# Patient Record
Sex: MALE | Race: WHITE | NOT HISPANIC OR LATINO | Employment: OTHER | URBAN - METROPOLITAN AREA
[De-identification: names, ages, dates, MRNs, and addresses within clinical notes are randomized per-mention and may not be internally consistent; named-entity substitution may affect disease eponyms.]

---

## 2024-06-12 ENCOUNTER — APPOINTMENT (OUTPATIENT)
Dept: GENERAL RADIOLOGY | Facility: HOSPITAL | Age: 49
End: 2024-06-12
Payer: OTHER GOVERNMENT

## 2024-06-12 ENCOUNTER — HOSPITAL ENCOUNTER (EMERGENCY)
Facility: HOSPITAL | Age: 49
Discharge: HOME | End: 2024-06-12
Attending: EMERGENCY MEDICINE
Payer: OTHER GOVERNMENT

## 2024-06-12 ENCOUNTER — APPOINTMENT (OUTPATIENT)
Dept: ULTRASOUND IMAGING | Facility: HOSPITAL | Age: 49
End: 2024-06-12
Payer: OTHER GOVERNMENT

## 2024-06-12 ENCOUNTER — APPOINTMENT (OUTPATIENT)
Dept: CT IMAGING | Facility: HOSPITAL | Age: 49
End: 2024-06-12
Payer: OTHER GOVERNMENT

## 2024-06-12 VITALS
BODY MASS INDEX: 27.72 KG/M2 | TEMPERATURE: 97.6 F | OXYGEN SATURATION: 98 % | SYSTOLIC BLOOD PRESSURE: 124 MMHG | DIASTOLIC BLOOD PRESSURE: 69 MMHG | RESPIRATION RATE: 18 BRPM | HEIGHT: 71 IN | WEIGHT: 198 LBS | HEART RATE: 52 BPM

## 2024-06-12 DIAGNOSIS — K22.4 ESOPHAGEAL SPASM: ICD-10-CM

## 2024-06-12 DIAGNOSIS — K80.21 CALCULUS OF GALLBLADDER WITH BILIARY OBSTRUCTION BUT WITHOUT CHOLECYSTITIS: Primary | ICD-10-CM

## 2024-06-12 DIAGNOSIS — R07.89 ATYPICAL CHEST PAIN: ICD-10-CM

## 2024-06-12 LAB
ALBUMIN SERPL-MCNC: 4.4 G/DL (ref 3.5–5.2)
ALBUMIN/GLOB SERPL: 1.8 G/DL
ALP SERPL-CCNC: 65 U/L (ref 39–117)
ALT SERPL W P-5'-P-CCNC: 89 U/L (ref 1–41)
ANION GAP SERPL CALCULATED.3IONS-SCNC: 10 MMOL/L (ref 5–15)
APTT PPP: 24.8 SECONDS (ref 22.7–35.4)
AST SERPL-CCNC: 168 U/L (ref 1–40)
BASOPHILS # BLD AUTO: 0.07 10*3/MM3 (ref 0–0.2)
BASOPHILS NFR BLD AUTO: 0.7 % (ref 0–1.5)
BILIRUB SERPL-MCNC: 0.2 MG/DL (ref 0–1.2)
BUN SERPL-MCNC: 16 MG/DL (ref 6–20)
BUN/CREAT SERPL: 16.7 (ref 7–25)
CALCIUM SPEC-SCNC: 9.5 MG/DL (ref 8.6–10.5)
CHLORIDE SERPL-SCNC: 104 MMOL/L (ref 98–107)
CO2 SERPL-SCNC: 27 MMOL/L (ref 22–29)
CREAT SERPL-MCNC: 0.96 MG/DL (ref 0.76–1.27)
DEPRECATED RDW RBC AUTO: 40 FL (ref 37–54)
EGFRCR SERPLBLD CKD-EPI 2021: 96.3 ML/MIN/1.73
EOSINOPHIL # BLD AUTO: 0.1 10*3/MM3 (ref 0–0.4)
EOSINOPHIL NFR BLD AUTO: 1 % (ref 0.3–6.2)
ERYTHROCYTE [DISTWIDTH] IN BLOOD BY AUTOMATED COUNT: 12.6 % (ref 12.3–15.4)
GEN 5 2HR TROPONIN T REFLEX: 11 NG/L
GLOBULIN UR ELPH-MCNC: 2.4 GM/DL
GLUCOSE SERPL-MCNC: 148 MG/DL (ref 65–99)
HCT VFR BLD AUTO: 39.2 % (ref 37.5–51)
HGB BLD-MCNC: 12.9 G/DL (ref 13–17.7)
HOLD SPECIMEN: NORMAL
HOLD SPECIMEN: NORMAL
IMM GRANULOCYTES # BLD AUTO: 0.02 10*3/MM3 (ref 0–0.05)
IMM GRANULOCYTES NFR BLD AUTO: 0.2 % (ref 0–0.5)
INR PPP: 0.95 (ref 0.9–1.1)
LIPASE SERPL-CCNC: 63 U/L (ref 13–60)
LYMPHOCYTES # BLD AUTO: 2.93 10*3/MM3 (ref 0.7–3.1)
LYMPHOCYTES NFR BLD AUTO: 29.7 % (ref 19.6–45.3)
MCH RBC QN AUTO: 28.7 PG (ref 26.6–33)
MCHC RBC AUTO-ENTMCNC: 32.9 G/DL (ref 31.5–35.7)
MCV RBC AUTO: 87.1 FL (ref 79–97)
MONOCYTES # BLD AUTO: 0.49 10*3/MM3 (ref 0.1–0.9)
MONOCYTES NFR BLD AUTO: 5 % (ref 5–12)
NEUTROPHILS NFR BLD AUTO: 6.25 10*3/MM3 (ref 1.7–7)
NEUTROPHILS NFR BLD AUTO: 63.4 % (ref 42.7–76)
NRBC BLD AUTO-RTO: 0 /100 WBC (ref 0–0.2)
NT-PROBNP SERPL-MCNC: <36 PG/ML (ref 0–900)
PLATELET # BLD AUTO: 296 10*3/MM3 (ref 140–450)
PMV BLD AUTO: 10.8 FL (ref 6–12)
POTASSIUM SERPL-SCNC: 3.7 MMOL/L (ref 3.5–5.2)
PROT SERPL-MCNC: 6.8 G/DL (ref 6–8.5)
PROTHROMBIN TIME: 12.9 SECONDS (ref 11.7–14.2)
QT INTERVAL: 375 MS
QTC INTERVAL: 375 MS
RBC # BLD AUTO: 4.5 10*6/MM3 (ref 4.14–5.8)
SODIUM SERPL-SCNC: 141 MMOL/L (ref 136–145)
TROPONIN T DELTA: 2 NG/L
TROPONIN T SERPL HS-MCNC: 9 NG/L
WBC NRBC COR # BLD AUTO: 9.86 10*3/MM3 (ref 3.4–10.8)
WHOLE BLOOD HOLD COAG: NORMAL
WHOLE BLOOD HOLD SPECIMEN: NORMAL

## 2024-06-12 PROCEDURE — 83690 ASSAY OF LIPASE: CPT | Performed by: PHYSICIAN ASSISTANT

## 2024-06-12 PROCEDURE — 71045 X-RAY EXAM CHEST 1 VIEW: CPT

## 2024-06-12 PROCEDURE — 80053 COMPREHEN METABOLIC PANEL: CPT | Performed by: EMERGENCY MEDICINE

## 2024-06-12 PROCEDURE — 36415 COLL VENOUS BLD VENIPUNCTURE: CPT

## 2024-06-12 PROCEDURE — 83880 ASSAY OF NATRIURETIC PEPTIDE: CPT | Performed by: PHYSICIAN ASSISTANT

## 2024-06-12 PROCEDURE — 74174 CTA ABD&PLVS W/CONTRAST: CPT

## 2024-06-12 PROCEDURE — 76705 ECHO EXAM OF ABDOMEN: CPT

## 2024-06-12 PROCEDURE — 85730 THROMBOPLASTIN TIME PARTIAL: CPT | Performed by: PHYSICIAN ASSISTANT

## 2024-06-12 PROCEDURE — 93010 ELECTROCARDIOGRAM REPORT: CPT | Performed by: INTERNAL MEDICINE

## 2024-06-12 PROCEDURE — 96374 THER/PROPH/DIAG INJ IV PUSH: CPT

## 2024-06-12 PROCEDURE — 93005 ELECTROCARDIOGRAM TRACING: CPT | Performed by: EMERGENCY MEDICINE

## 2024-06-12 PROCEDURE — 25810000003 SODIUM CHLORIDE 0.9 % SOLUTION: Performed by: PHYSICIAN ASSISTANT

## 2024-06-12 PROCEDURE — 85610 PROTHROMBIN TIME: CPT | Performed by: PHYSICIAN ASSISTANT

## 2024-06-12 PROCEDURE — 85025 COMPLETE CBC W/AUTO DIFF WBC: CPT | Performed by: EMERGENCY MEDICINE

## 2024-06-12 PROCEDURE — 99285 EMERGENCY DEPT VISIT HI MDM: CPT

## 2024-06-12 PROCEDURE — 84484 ASSAY OF TROPONIN QUANT: CPT | Performed by: EMERGENCY MEDICINE

## 2024-06-12 PROCEDURE — 71275 CT ANGIOGRAPHY CHEST: CPT

## 2024-06-12 PROCEDURE — 96361 HYDRATE IV INFUSION ADD-ON: CPT

## 2024-06-12 PROCEDURE — 25510000001 IOPAMIDOL PER 1 ML: Performed by: EMERGENCY MEDICINE

## 2024-06-12 RX ORDER — SODIUM CHLORIDE 0.9 % (FLUSH) 0.9 %
10 SYRINGE (ML) INJECTION AS NEEDED
Status: DISCONTINUED | OUTPATIENT
Start: 2024-06-12 | End: 2024-06-12 | Stop reason: HOSPADM

## 2024-06-12 RX ORDER — MORPHINE SULFATE 2 MG/ML
4 INJECTION, SOLUTION INTRAMUSCULAR; INTRAVENOUS ONCE
Status: DISCONTINUED | OUTPATIENT
Start: 2024-06-12 | End: 2024-06-12 | Stop reason: HOSPADM

## 2024-06-12 RX ORDER — ONDANSETRON 4 MG/1
4 TABLET, ORALLY DISINTEGRATING ORAL ONCE
Status: DISCONTINUED | OUTPATIENT
Start: 2024-06-12 | End: 2024-06-12

## 2024-06-12 RX ORDER — OMEPRAZOLE 40 MG/1
40 CAPSULE, DELAYED RELEASE ORAL DAILY
COMMUNITY

## 2024-06-12 RX ORDER — ASPIRIN 81 MG/1
324 TABLET, CHEWABLE ORAL ONCE
Status: DISCONTINUED | OUTPATIENT
Start: 2024-06-12 | End: 2024-06-12 | Stop reason: HOSPADM

## 2024-06-12 RX ORDER — ROSUVASTATIN CALCIUM 10 MG/1
10 TABLET, COATED ORAL DAILY
COMMUNITY

## 2024-06-12 RX ORDER — ALBUTEROL SULFATE 90 UG/1
2 AEROSOL, METERED RESPIRATORY (INHALATION) EVERY 4 HOURS PRN
COMMUNITY

## 2024-06-12 RX ORDER — FAMOTIDINE 10 MG/ML
20 INJECTION, SOLUTION INTRAVENOUS ONCE
Status: COMPLETED | OUTPATIENT
Start: 2024-06-12 | End: 2024-06-12

## 2024-06-12 RX ADMIN — SODIUM CHLORIDE 1000 ML: 9 INJECTION, SOLUTION INTRAVENOUS at 03:11

## 2024-06-12 RX ADMIN — FAMOTIDINE 20 MG: 10 INJECTION INTRAVENOUS at 03:12

## 2024-06-12 RX ADMIN — IOPAMIDOL 95 ML: 755 INJECTION, SOLUTION INTRAVENOUS at 03:34

## 2024-06-12 NOTE — ED PROVIDER NOTES
SHARED VISIT: This visit was performed by BOTH a physician and an APC. The substantive portion of the medical decision making was performed by this attesting physician who made or approved the management plan and takes responsibility for patient management. All studies in the APC note (if performed) were independently interpreted by me.      The AUGUSTA and I have discussed this patient's history, physical exam, and treatment plan.  I have reviewed the documentation and personally had a face to face interaction with the patient. I affirm the documentation and agree with the treatment and plan.  The attached note describes my personal findings.       I provided a substantive portion of the care of the patient.  I personally performed the physical exam in its entirety, and below are my findings.        History  52-year-old [employer] in [employer] presents with epigastric pain that began abruptly earlier this evening.  Pain felt fairly severe and then resolved significantly upon ED arrival.  He has remained pain-free here over the last 3 hours.    Physical Exam  Vital Signs reviewed  GENERAL: Alert male no obvious distress.  Triage vitals reviewed notable for blood pressure 111/67.  Pulse 63.  Temperature and O2 sats normal  HENT: nares patent  EYES: no scleral icterus  CV: regular rhythm, regular rate-no murmur  RESPIRATORY: normal effort, clear to auscultation bilaterally-O2 sats upper 90s room air  ABDOMEN: soft, nontender to palpation  MUSCULOSKELETAL: no deformity  NEURO: Strength sensation and coordination are grossly intact.  Speech and mentation are unremarkable  SKIN: warm, dry      Assessment and Data Review    ED Course as of 06/12/24 0555   Wed Jun 12, 2024   7558 I discussed the case with Dr. Bermeo and they agree to evaluate the patient at the bedside.    [CC]   3287 EKG independently interpreted by me    Time 2:54 AM  Sinus 60  Normal P waves and CA intervals  QRS-normal axis, normal QRS  ST, T  waves-nonspecific changes  No prior to compare [DB]   0324 EKG independently interpreted by me    Time 2:54 AM  Sinus 60  Normal P waves and MA intervals  QRS-normal axis, normal QRS  ST, T waves-nonspecific changes [DB]   0327 XR Chest 1 View  My independent interpretation of the chest x-ray is no acute infiltrate [CC]   0337 CT Angiogram Chest  My independent interpretation of the CTA is no obvious aortic dissection [CC]   0337 CT Angiogram Abdomen Pelvis  My depend interpretation of the CTA of the abdomen pelvis is large gallstone in the gallbladder without gallbladder wall thickening [CC]   0340 ALT (SGPT)(!): 89 [CC]   0341 AST (SGOT)(!): 168 [CC]   0342 HS Troponin T: 9 [CC]   0343 Patient reports he is pain-free.  He did not take any of the pain medication as his pain had resolved by the time nursing staff went to give him the pain medication.  CT is pending. [CC]   0354 Lipase(!): 63 [CC]   0431 Rechecked on patient: He has remained pain-free while here in the ED.  Discussed with him that we will obtain gallbladder ultrasound to rule out gallbladder pathology but I suspect at the least symptomatic cholelithiasis.  I have a low suspicion for cardiac etiology given the nature of the patient's pain and sudden resolution of pain without intervention. [CC]   0530 US Liver  Patient is having no pain at this time.  I do not suspect acute cholecystitis. [CC]   0540 HS Troponin T: 11 [CC]      ED Course User Index  [CC] Kassidy Christianson PA-C  [DB] Chito Bermeo MD     I discussed treatment and evaluation this patient with JUANCHO Christianson.  I did independently interpret EKG, x-ray and labs.  Patient has had normal high-sensitivity troponin x 2 with benign EKG.  Suspect likely cause of his pain is from gallstone.  Is mildly elevated AST and ALT but has been completely pain-free here for last 3 hours.  He has not from this area but is living in [place].  As he is pain-free without treatment I do not think he  will need emergency surgery and can travel back  To see general surgery in his home state of [place] rather than be admitted here for emergency cholecystectomy.  Discussed results and treatment plan with patient who is comfortable with going home and following up when he gets back to [place].       Chito Bermeo MD  06/12/24 0701

## 2024-06-12 NOTE — DISCHARGE INSTRUCTIONS
Please follow-up with your PCP.    Rest.  Increase fluid intake.     Although you are being discharged in the ED today, I encouraged her to return for worsening symptoms.  Things can, and do, change such a treatment at home with medication may not be adequate.  Specifically I recommend returning for chest pain or discomfort, difficulty breathing, persistent vomiting or difficulty holding down liquids or medications, fever > 102.0 F,  or any other worsening or alarming symptoms.       You have been evaluated in the emergency department for your presenting symptoms and deemed appropriate for discharge home.  Understand that your health care does not end here today.  It is important that your primary care physician be made aware of your visit.  I recommend calling your primary care provider in the next 48 hours to arrange follow-up care.  Your primary care provider needs to review your treatment and recovery to ensure that no further treatment is necessary.  Additional testing or procedures may be necessary as an outpatient at the discretion of your primary care provider.    I also recommend following up with your PCP for recheck of your blood pressure and treatment as needed.

## 2024-06-12 NOTE — ED PROVIDER NOTES
Patient: Ondina Salmon Date: 2020   : 1959    61 year old female      INPATIENT WOUND CARE PROGRESS NOTE    Supervising Wound Care / Hyperbaric Medicine Physician: Not Applicable  Consulting Provider:  KEYA Byrnes  Date of Consultation/Last Comprehensive Exam:  20  Referring  Provider:  Dr. Hennessy    SUBJECTIVE:    Chief Complaint:  Sacral ulcer    Wound/Ulcer Present:    Pressure ulcer, other sites    Additional Wound Category:  None     Maximum Baseline Ambulatory Status:  Unable to assess    History of Present Illness:  This is a 61 year old female with a medical history of  traumatic cord transection with paraplegia, intracranial bleeding, neurogenic bladder with Engel catheter, recurrent urinary tract infection and dysphagia. Patient admitted to [facility] 9/3/20 for concerns of worsening buttock wound. She was scheduled for a outpatient wound care consult . She has visiting nurses who contacted our clinic about new palpable bone and it was recommended she present to the ED.    \"States she resides at a nursing home. She think her left ischial wound has been present since March of this year. She wasn't sure what wound cares the nursing home was doing. She states a few weeks ago she was told about her sacral wound and her  ordered VNA to start seeing her. Patient has no pain. States her bed at the facility is \"raggity\" and old. She sits up in her wheelchair most of the day and she thinks it has a decent cushion on it\".     Interval history:  Patient seen today with inpatient wound RN team for f/u evaluation of multiple wounds and VAC change.  Patient underwent laparoscopy Danny procedure, splenic flexure mobilization, cystorrhaphy for bladder fistula, and diverting ileostomy creation with Dr. Leigh on 2020.      Seen today resting on Dolphin bed; NG tube to suction.  Patient does not offer much verbally, but does hold up a note pad repeatedly asking for Wolof ice.    EMERGENCY DEPARTMENT ENCOUNTER  Room Number:  07/07  PCP: Provider, No Known  Independent Historians: Patient      HPI:  Chief Complaint: had concerns including Chest Pain.     A complete HPI/ROS/PMH/PSH/SH/FH are unobtainable due to: None    Chronic or social conditions impacting patient care (Social Determinants of Health): None      Context: Chito Samayoa is a 48 y.o. male with a medical history of hyperlipidemia presents emergency department today with severe epigastric/lower chest pain that began earlier this evening.  Patient says that it feels like a ripping and tearing in his upper abdomen.  He says the pain is constant but waxes and wanes in intensity.  He denies palliative or provocative factors.  He denies any vomiting but has had nausea.  He does have a history of Worthy's esophagus and takes omeprazole daily.  He denies hematemesis.  He denies lower abdominal pain or diarrhea.  He denies fever but has had chills.  He says he called EMS when he was pacing due to the pain and felt like he may pass out.  That sensation has since passed.  He says he does have some tingling in both his arms and legs.  Patient received 325 of aspirin and 4 nitro en route and had a significant drop in his blood pressure with the 4 nitroglycerin.  Patient denies a personal history of CAD but says his dad had a heart attack around the age of 60.  The patient has never had a stress test.  He is not a smoker.  Patient had sushi for dinner.  He denies history of abdominal surgeries.      Review of prior external notes (non-ED) -and- Review of prior external test results outside of this encounter:   Extensive review of the EPIC system as well as [facility] reveals no prior visit notes and no prior diagnostic studies available for review.       PAST MEDICAL HISTORY  Active Ambulatory Problems     Diagnosis Date Noted    No Active Ambulatory Problems     Resolved Ambulatory Problems     Diagnosis Date Noted    No Resolved  Ambulatory Problems     No Additional Past Medical History         PAST SURGICAL HISTORY  No past surgical history on file.      FAMILY HISTORY  No family history on file.      SOCIAL HISTORY  Social History     Socioeconomic History    Marital status:          ALLERGIES  Patient has no known allergies.      REVIEW OF SYSTEMS  Included in HPI  All systems reviewed and negative except for those discussed in HPI.      PHYSICAL EXAM    I have reviewed the triage vital signs and nursing notes.    ED Triage Vitals   Temp Pulse Resp BP SpO2   -- -- -- -- --      Temp src Heart Rate Source Patient Position BP Location FiO2 (%)   -- -- -- -- --       Physical Exam  Constitutional:       General: He is in acute distress.      Appearance: Normal appearance. He is ill-appearing. He is not diaphoretic.   HENT:      Head: Normocephalic and atraumatic.      Mouth/Throat:      Mouth: Mucous membranes are moist.   Eyes:      Extraocular Movements: Extraocular movements intact.      Pupils: Pupils are equal, round, and reactive to light.   Cardiovascular:      Rate and Rhythm: Normal rate and regular rhythm.      Pulses: Normal pulses.           Radial pulses are 2+ on the right side and 2+ on the left side.        Dorsalis pedis pulses are 2+ on the right side and 2+ on the left side.      Heart sounds: Normal heart sounds.   Pulmonary:      Effort: Pulmonary effort is normal. No respiratory distress.      Breath sounds: Normal breath sounds.   Abdominal:      General: Abdomen is flat. There is no distension.      Palpations: Abdomen is soft. There is no mass.      Tenderness: There is no abdominal tenderness.      Comments: No reproducible tenderness to palpation.  No pulsatile mass.   Musculoskeletal:         General: Normal range of motion.      Cervical back: Normal range of motion and neck supple.   Skin:     General: Skin is warm and dry.      Capillary Refill: Capillary refill takes less than 2 seconds.       Reports soreness to her backside.  When discussing her wound progress further, patient proceeds to tell me to \"be quiet\".       Current Treatment Regimen:  Dressing:  Negative-pressure wound therapy   Frequency:  Three times a week   Changed by:  Inpatient wound care team    Review of Systems:  Review of systems not obtained due to patient factors.    Past Medical History:   Diagnosis Date   • Arthritis    • Bronchitis    • Chronic pain    • Diverticulitis    • Essential (primary) hypertension    • Fracture    • Gastroesophageal reflux disease    • Inflammatory bowel disease     Pt denied on 5/8/2020   • Osteoporosis    • RAD (reactive airway disease)      Past Surgical History:   Procedure Laterality Date   • Ankle surgery     • Back surgery  12/28/2019   • Colonoscopy     • Cystoscopy  09/16/2020    with cystogram   • Egd  06/25/2020    peg removal    • Esophagogastroduodenoscopy     • Hernia repair     • Hysterectomy     • Service to gastroenterology       Social History     Socioeconomic History   • Marital status: Single     Spouse name: Not on file   • Number of children: Not on file   • Years of education: Not on file   • Highest education level: Not on file   Occupational History   • Not on file   Social Needs   • Financial resource strain: Not on file   • Food insecurity     Worry: Not on file     Inability: Not on file   • Transportation needs     Medical: Not on file     Non-medical: Not on file   Tobacco Use   • Smoking status: Former Smoker     Types: Cigarettes   • Smokeless tobacco: Never Used   • Tobacco comment: pt has 2 cigarettes a week   Substance and Sexual Activity   • Alcohol use: Not Currently     Comment:  pt drinks 1 glass of wine on weekends     • Drug use: Not Currently     Types: Marijuana     Comment: used marijuana on  09/12/2015   • Sexual activity: Not on file   Lifestyle   • Physical activity     Days per week: Not on file     Minutes per session: Not on file   • Stress: Not on file    Relationships   • Social connections     Talks on phone: Not on file     Gets together: Not on file     Attends Taoist service: Not on file     Active member of club or organization: Not on file     Attends meetings of clubs or organizations: Not on file     Relationship status: Not on file   • Intimate partner violence     Fear of current or ex partner: Not on file     Emotionally abused: Not on file     Physically abused: Not on file     Forced sexual activity: Not on file   Other Topics Concern   • Not on file   Social History Narrative   • Not on file     Family History   Problem Relation Age of Onset   • Cancer Mother    • Hypertension Mother    • Diabetes Mother    • Diabetes Maternal Aunt    • Stroke Maternal Grandmother        Current Facility-Administered Medications   Medication   • acetaminophen (TYLENOL) suppository 325 mg   • dextrose 5 % / sodium chloride 0.45% with KCl 20 mEq infusion   • potassium CHLORIDE 20 mEq/100mL IVPB premix   • metoPROLOL (LOPRESSOR) injection 2.5 mg   • acetaminophen (TYLENOL) tablet 1,000 mg   • traMADol (ULTRAM) tablet 25 mg   • HYDROmorphone (DILAUDID) injection 0.3 mg   • ondansetron (ZOFRAN ODT) disintegrating tablet 4 mg    Or   • ondansetron (ZOFRAN) injection 4 mg   • prochlorperazine (COMPAZINE) tablet 5 mg    Or   • prochlorperazine (COMPAZINE) injection 5 mg   • chlorhexidine gluconate (PERIDEX) 0.12 % solution 15 mL   • calcium carbonate (TUMS) chewable tablet 1,000 mg   • diphenhydrAMINE (BENADRYL) capsule 25 mg   • hyoscyamine (LEVSIN SL) sublingual tablet 0.125 mg   • Potassium Standard Replacement Protocol   • piperacillin-tazobactam (ZOSYN) 3.375 g in sodium chloride 0.9 % 100 mL IVPB   • acetaminophen (TYLENOL) tablet 650 mg   • diphenhydrAMINE (BENADRYL) capsule 25 mg   • polyethylene glycol (MIRALAX) packet 17 g   • guaiFENesin-DM (MUCINEX DM) 600-30 mg ER tablet 2 tablet   • melatonin tablet 9 mg   • sodium chloride (PF) 0.9 % injection 10 mL   •  Coloration: Skin is pale.   Neurological:      General: No focal deficit present.      Mental Status: He is alert and oriented to person, place, and time.   Psychiatric:         Mood and Affect: Mood normal.         Behavior: Behavior normal.         LAB RESULTS  Recent Results (from the past 24 hour(s))   ECG 12 Lead Chest Pain    Collection Time: 06/12/24  2:54 AM   Result Value Ref Range    QT Interval 375 ms    QTC Interval 375 ms   Comprehensive Metabolic Panel    Collection Time: 06/12/24  3:04 AM    Specimen: Blood   Result Value Ref Range    Glucose 148 (H) 65 - 99 mg/dL    BUN 16 6 - 20 mg/dL    Creatinine 0.96 0.76 - 1.27 mg/dL    Sodium 141 136 - 145 mmol/L    Potassium 3.7 3.5 - 5.2 mmol/L    Chloride 104 98 - 107 mmol/L    CO2 27.0 22.0 - 29.0 mmol/L    Calcium 9.5 8.6 - 10.5 mg/dL    Total Protein 6.8 6.0 - 8.5 g/dL    Albumin 4.4 3.5 - 5.2 g/dL    ALT (SGPT) 89 (H) 1 - 41 U/L    AST (SGOT) 168 (H) 1 - 40 U/L    Alkaline Phosphatase 65 39 - 117 U/L    Total Bilirubin 0.2 0.0 - 1.2 mg/dL    Globulin 2.4 gm/dL    A/G Ratio 1.8 g/dL    BUN/Creatinine Ratio 16.7 7.0 - 25.0    Anion Gap 10.0 5.0 - 15.0 mmol/L    eGFR 96.3 >60.0 mL/min/1.73   High Sensitivity Troponin T    Collection Time: 06/12/24  3:04 AM    Specimen: Blood   Result Value Ref Range    HS Troponin T 9 <22 ng/L   Green Top (Gel)    Collection Time: 06/12/24  3:04 AM   Result Value Ref Range    Extra Tube Hold for add-ons.    Lavender Top    Collection Time: 06/12/24  3:04 AM   Result Value Ref Range    Extra Tube hold for add-on    Gold Top - SST    Collection Time: 06/12/24  3:04 AM   Result Value Ref Range    Extra Tube Hold for add-ons.    Light Blue Top    Collection Time: 06/12/24  3:04 AM   Result Value Ref Range    Extra Tube Hold for add-ons.    CBC Auto Differential    Collection Time: 06/12/24  3:04 AM    Specimen: Blood   Result Value Ref Range    WBC 9.86 3.40 - 10.80 10*3/mm3    RBC 4.50 4.14 - 5.80 10*6/mm3    Hemoglobin 12.9  (L) 13.0 - 17.7 g/dL    Hematocrit 39.2 37.5 - 51.0 %    MCV 87.1 79.0 - 97.0 fL    MCH 28.7 26.6 - 33.0 pg    MCHC 32.9 31.5 - 35.7 g/dL    RDW 12.6 12.3 - 15.4 %    RDW-SD 40.0 37.0 - 54.0 fl    MPV 10.8 6.0 - 12.0 fL    Platelets 296 140 - 450 10*3/mm3    Neutrophil % 63.4 42.7 - 76.0 %    Lymphocyte % 29.7 19.6 - 45.3 %    Monocyte % 5.0 5.0 - 12.0 %    Eosinophil % 1.0 0.3 - 6.2 %    Basophil % 0.7 0.0 - 1.5 %    Immature Grans % 0.2 0.0 - 0.5 %    Neutrophils, Absolute 6.25 1.70 - 7.00 10*3/mm3    Lymphocytes, Absolute 2.93 0.70 - 3.10 10*3/mm3    Monocytes, Absolute 0.49 0.10 - 0.90 10*3/mm3    Eosinophils, Absolute 0.10 0.00 - 0.40 10*3/mm3    Basophils, Absolute 0.07 0.00 - 0.20 10*3/mm3    Immature Grans, Absolute 0.02 0.00 - 0.05 10*3/mm3    nRBC 0.0 0.0 - 0.2 /100 WBC   BNP    Collection Time: 06/12/24  3:04 AM    Specimen: Blood   Result Value Ref Range    proBNP <36.0 0.0 - 900.0 pg/mL   aPTT    Collection Time: 06/12/24  3:04 AM    Specimen: Blood   Result Value Ref Range    PTT 24.8 22.7 - 35.4 seconds   Protime-INR    Collection Time: 06/12/24  3:04 AM    Specimen: Blood   Result Value Ref Range    Protime 12.9 11.7 - 14.2 Seconds    INR 0.95 0.90 - 1.10   Lipase    Collection Time: 06/12/24  3:04 AM    Specimen: Blood   Result Value Ref Range    Lipase 63 (H) 13 - 60 U/L   High Sensitivity Troponin T 2Hr    Collection Time: 06/12/24  5:09 AM    Specimen: Blood   Result Value Ref Range    HS Troponin T 11 <22 ng/L    Troponin T Delta 2 >=-4 - <+4 ng/L         RADIOLOGY  US Liver    Addendum Date: 6/12/2024    No hydronephrosis is seen. Renal echotexture is normal.  This report was finalized on 6/12/2024 5:51 AM by Dr. Demetria Guidry M.D on Workstation: [id number]      Result Date: 6/12/2024  LIVER ULTRASOUND  HISTORY: Abnormal CT  COMPARISON: June 12, 2024  TECHNIQUE: Grayscale and color Doppler sonographic images were obtained through the right upper quadrant.  FINDINGS: Pancreas cannot be  sodium chloride (PF) 0.9 % injection 10 mL   • sodium chloride (PF) 0.9 % injection 10 mL   • sodium chloride (PF) 0.9 % injection 20 mL   • alteplase (CATHFLO ACTIVASE) injection 2 mg   • sodium hypochlorite (DAKINS) 0.125 % (1/4 strength) irrigation solution 1 application   • sodium hypochlorite (DAKINS) 0.125 % (1/4 strength) irrigation solution 1 application   • traZODone (DESYREL) tablet 50 mg   • levothyroxine (SYNTHROID, LEVOTHROID) tablet 75 mcg   • pantoprazole (PROTONIX) EC tablet 40 mg   • sodium chloride 0.9 % flush bag 25 mL   • sodium chloride (PF) 0.9 % injection 2 mL   • gabapentin (NEURONTIN) capsule 300 mg   • sodium chloride 0.9 % flush bag 25 mL   • enoxaparin (LOVENOX) injection 40 mg   • Magnesium Standard Replacement Protocol   • docusate sodium-sennosides (SENOKOT S) 50-8.6 MG 2 tablet        ALLERGIES:  Sulfa antibiotics    OBJECTIVE:  Vital Signs:    Visit Vitals  BP (!) 150/89 (BP Location: LUE - Left upper extremity, Patient Position: Semi-Payton's)   Pulse 78   Temp 97.7 °F (36.5 °C) (Oral)   Resp 20   Ht 5' 9\" (1.753 m)   Wt 82.6 kg   SpO2 100%   BMI 26.89 kg/m²         Physical Exam:  General appearance: Appears stated age, Alert, well-developed, oriented to Situation, in no distress and uncooperative  Head:   normocephalic without obvious abnormality  Eye:  Conjunctivae/sclerae normal. No erythema, edema or exudate.  Mouth:   mucous membranes moist  Pulmonary: normal respiratory effort  Ulcer and Wound: See below    9/28/2020    R ischial ulcer is full thickness.  Wound base is mostly granular with scattered fibrin throughout.  No purulence or malodor.  No palpable exposed bone appreciated on exam today.  Flora-wound without erythema, increased warmth, or induration.  No acute infection.    Sacral wound is full thickness and granular with some fibrinous slough throughout.  Palpable exposed bone on exam today.  Tunnels towards 12 o'clock.  No purulence or malodor.  Flora-wound with two  areas of superficial skin breakdown noted (9 and 12 o'clock), but without erythema, increased warmth, or induration.  No acute infection.     R ischium:      Sacrum:        Previous images (09/21/2020):        Wound Bed Quality:  see above      Flora-wound Quality:    see above    Additional Descriptors:  see above    Wound Measurements Per Flowsheet:       Wound Buttock Left (Active)   Number of days: 69       Wound Sacrum Pressure Injury (Active)   Wound Length (cm) 5.9 cm 09/21/20 0830   Wound Width (cm) 10 cm 09/21/20 0830   Wound Depth (cm) 3.3 cm 09/21/20 0830   Wound Surface Area (cm^2) 59 cm^2 09/21/20 0830   Wound Volume (cm^3) 194.7 cm^3 09/21/20 0830   Number of days: 32       Wound Ischial tuberosity Left Proximal;Posterior Pressure Injury (Active)   Wound Length (cm) 4 cm 09/21/20 0830   Wound Width (cm) 2 cm 09/21/20 0830   Wound Depth (cm) 5.7 cm 09/21/20 0830   Wound Surface Area (cm^2) 8 cm^2 09/21/20 0830   Wound Volume (cm^3) 45.6 cm^3 09/21/20 0830   Number of days: 25       Wound Heel Pressure Injury (Active)   Number of days: 25       Wound Abdomen  Incision (Active)   Number of days: 4       Wound Abdomen Midline/Middle Incision (Active)   Number of days: 4       Wound Labia  Pressure Injury (Active)   Number of days: 3         PROCEDURE:  None performed    Procedure was Performed by:  Not applicable    Laboratory assessments reviewed:  No results found for: PAB   Albumin (g/dL)   Date Value   09/27/2020 1.8 (L)   09/25/2020 2.1 (L)   09/17/2020 1.8 (L)   01/11/2020 2.0 (L)      No results available in last 24 hours    Lab Results   Component Value Date    WBC 10.1 09/28/2020    WBC 10.1 09/28/2020    GLUCOSE 67 09/27/2020    CRP 20.0 (H) 09/03/2020    RESR >120 (H) 09/03/2020    CREATININE 0.32 (L) 09/27/2020    GFRA >90 01/30/2020    GFRNA >90 01/30/2020        Culture results:  No results found for: SDES No results found for: CULT     Diagnostic Assessments Reviewed:  MRI  and  visualized due to overlying bowel gas. Liver measures 15.6 cm in craniocaudal dimensions. No focal hepatic lesions are seen. There is no intra or extrahepatic biliary dilatation. Common bile duct measures 4 mm. Right kidney measures 11.5 x 5.1 x 5.5 cm. The patient is noted to have cholelithiasis. Gallbladder wall is thickened, measuring 4 to 5 mm. I don't see any definite pericholecystic fluid. Patient may also have some adenomyomatosis. No sonographic Araya's sign was elicited, although the patient had been premedicated.       1. Cholelithiasis, as well as some mild gallbladder wall thickening. Acute cholecystitis is not excluded. If clinical doubt persist, HIDA scan could be considered for further assessment. 2. Adenomyomatosis. 3. No intra or extrahepatic biliary dilatation is seen.  This report was finalized on 6/12/2024 5:21 AM by Dr. Demetria Guidry M.D on Workstation: [id number]      CT Angiogram Chest, CT Angiogram Abdomen Pelvis    Result Date: 6/12/2024  CT ANGIOGRAM OF THE CHEST; CT ANGIOGRAM OF THE ABDOMEN AND PELVIS  HISTORY: Chest and back pain  COMPARISON: None available.  TECHNIQUE: Axial CT imaging was obtained through the chest, abdomen, and pelvis. IV contrast was administered. Three-D reformatted images were obtained.  FINDINGS: CHEST: Thoracic aorta is normal in caliber. There is no evidence of dissection. No acute pulmonary thromboembolus is seen. No significant coronary artery calcifications are seen. The thyroid gland, trachea, and esophagus appear unremarkable. Mediastinal lymph nodes are not pathologically enlarged. No acute infiltrates are seen. No acute osseous abnormalities are seen.  CT OF THE ABDOMEN AND PELVIS: The abdominal aorta is normal in caliber. The celiac axis and superior mesenteric artery are widely patent. There are 2 right renal arteries and 2 left renal arteries. The inferior mesenteric artery is patent. Common, external, and internal iliac arteries are normal in  Cultures    MRI 20 Pelvis  1. Ulcer extending to the left ischial tuberosity with underlying fistula  tuberosity osteomyelitis.  2. Sacral ulcer without definitive underlying osteomyelitis.  3. Possible perianal ulcer versus rectal prolapse. Rectal prolapse is  favored. Correlate with physical exam.  4. Additional findings as described.    Nutritional Assessment:  Prealbumin and/or Albumin reviewed    Wound treatment goals are palliative:  No    DIAGNOSES:  Pressure ulcer, other site, stage III and IV sacrum and left ischium   Paraplegia    Medical Decision Makin year old female admitted for worsening pressure injuries.    2020  Wounds appear stable today on exam; granular and clean.  Sacral wound with continued small amount of exposed bone.  Now with diverting ostomy, which will definitely be of benefit in the wound healing process.  No signs of acute infection today, appears to be tolerating VAC well.      Local wound care:  -Wash sacral and ischial wounds with mild soap and water, pat dry  -Continue NPWT to L ischium and sacral wounds -125 mmHg black foam continuous therapy; changed 3x weekly by inpatient wound care team  -Melgisorb Ag to areas of sacral shun-wound skin breakdown  -If VAC fails, place Dakin's-moist gauze dressing BID and PRN; cover with dry secondary dressing    Surgery:  -S/p sacral ulcer debridement with Acell placement on 2020 with Dr. Capmoverde  -S/p laparoscopic Danny procedure, splenic flexure mobilization, cystorrhaphy for bladder fistula, and diverting ileostomy with Dr. Leigh on 2020    Offloading:  -Patient requires aggressive offloading with frequent repositioning; at least every 1-2 hours   -Dolphin mattress  -Gel chair cushion PRN  -Prevalon boots on at all times while in bed    Nutrition:  -RD following; appreciate recommendations  -Encourage increased protein intake to facilitate wound healing  -Patient is non-diabetic    Infection:  -ID following -   caliber, and without hemodynamically significant stenosis. Patient does have some mild eccentric plaque involving the common femoral arteries bilaterally.  No suspicious hepatic lesions are seen. The patient does have cholelithiasis. Images suggest some possible gallbladder wall thickening. Cholecystitis is not excluded. Consider further assessment with gallbladder ultrasound. I don't see any intra or extrahepatic biliary dilatation. There is a small duodenal diverticulum. Pancreas appears normal. There is high density material which is layering dependently within the proximal stomach. Exact etiology is uncertain. Correlation with ingested material is recommended. The spleen and adrenal glands are normal. The kidneys enhance symmetrically. No hydronephrosis is seen. No distal ureteral or bladder stones are seen. Prostate gland is within normal limits. There is colonic diverticulosis. There is no bowel obstruction. The appendix is normal. No acute osseous abnormalities are identified.        1. No acute intrathoracic findings. 2. No evidence of aortic dissection. 3. The patient does have cholelithiasis, and there is potentially some gallbladder wall thickening. Correlation with gallbladder ultrasound is recommended. 4. High density material noted layering within the proximal stomach. Correlation with ingested material is recommended.   Radiation dose reduction techniques were utilized, including automated exposure control and exposure modulation based on body size.   This report was finalized on 6/12/2024 4:13 AM by Dr. Demetria Guidry M.D on Workstation: [id number]      XR Chest 1 View    Result Date: 6/12/2024  SINGLE VIEW OF THE CHEST  HISTORY: Chest pain  COMPARISON: None available.  FINDINGS: Heart size is within normal limits. No pneumothorax, pleural effusion, or acute infiltrate is seen.      No acute findings.  This report was finalized on 6/12/2024 3:17 AM by Dr. Demetria Guidry M.D on Workstation:  [id number]           MEDICATIONS GIVEN IN ER  Medications   morphine injection 4 mg (4 mg Intravenous Not Given 6/12/24 0401)   sodium chloride 0.9 % flush 10 mL (has no administration in time range)   aspirin chewable tablet 324 mg (324 mg Oral Not Given 6/12/24 0400)   famotidine (PEPCID) injection 20 mg (20 mg Intravenous Given 6/12/24 0312)   sodium chloride 0.9 % bolus 1,000 mL (0 mL Intravenous Stopped 6/12/24 0424)   iopamidol (ISOVUE-370) 76 % injection 100 mL (95 mL Intravenous Given 6/12/24 0334)           OUTPATIENT MEDICATION MANAGEMENT:  Current Facility-Administered Medications Ordered in Epic   Medication Dose Route Frequency Provider Last Rate Last Admin    aspirin chewable tablet 324 mg  324 mg Oral Once Lamont, Chito HEWITT MD        morphine injection 4 mg  4 mg Intravenous Once Elvie, Chito HEWITT MD        sodium chloride 0.9 % flush 10 mL  10 mL Intravenous PRN Elvie, Chito HEWITT MD         Current Outpatient Medications Ordered in Epic   Medication Sig Dispense Refill    omeprazole (priLOSEC) 40 MG capsule Take 1 capsule by mouth Daily.      rosuvastatin (CRESTOR) 10 MG tablet Take 1 tablet by mouth Daily.      albuterol sulfate  (90 Base) MCG/ACT inhaler Inhale 2 puffs Every 4 (Four) Hours As Needed for Wheezing.           PROGRESS, DATA ANALYSIS, CONSULTS, AND MEDICAL DECISION MAKING  ORDERS PLACED DURING THIS VISIT:  Orders Placed This Encounter   Procedures    XR Chest 1 View    CT Angiogram Chest    CT Angiogram Abdomen Pelvis    US Liver    [place] Draw    Comprehensive Metabolic Panel    High Sensitivity Troponin T    CBC Auto Differential    BNP    aPTT    Protime-INR    Lipase    High Sensitivity Troponin T 2Hr    Continuous Pulse Oximetry    Vital Signs    Oxygen Therapy- Nasal Cannula; Titrate 1-6 LPM Per SpO2; 90 - 95%    ECG 12 Lead Chest Pain    Insert Peripheral IV    CBC & Differential    Green Top (Gel)    Lavender Top    Gold Top - SST    Light Blue Top       All labs have  Dante  -Culture of sacral wound with bacteroides fragilis 09/03/2020; sacral tissue with pseudomonas aeruginosa 09/06/2020   -Currently on IV Zosyn  -Known chronic sacral OM with exposed bone  -ID planning x6 weeks IV antibiotic therapy (from debridement 09/06/2020) with IV ertapenem and PO cipro (tentative stop date 10/18/2020)  -Afebrile per chart review; WBCs WNL  -No signs of acute cellulitis on exam today    DC Planning:  -Home with home care [facility] under the agency   -Orders placed for [facility] under the agency   -Patient to follow up in 2 weeks in wound clinic, placed information for clinic in [place]      Will follow.      Plan of Care:  Advanced Wound Care Recommendations:  See above  Percent Wound Closure from consult:  See above  Care plan to augment wound closure:  Not applicable.         KEYA Mitchell  Inpatient Wound Care Pager [phone]               been independently interpreted by me.  All radiology studies have been reviewed by me. All EKG's have been independently viewed and interpreted by me.  Discussion below represents my analysis of pertinent findings related to patient's condition, differential diagnosis, treatment plan and final disposition.    Differential diagnosis includes but is not limited to:   My differential diagnosis for chest pain includes but is not limited to:  Muscle strain, costochondritis, myositis, pleurisy, rib fracture, intercostal neuritis, herpes zoster, tumor, myocardial infarction, coronary syndrome, unstable angina, angina, aortic dissection, mitral valve prolapse, pericarditis, palpitations, pulmonary embolus, pneumonia, pneumothorax, lung cancer, GERD, esophagitis, esophageal spasm      ED Course:  ED Course as of 06/12/24 0555   Wed Jun 12, 2024   0365 I discussed the case with Dr. Bermeo and they agree to evaluate the patient at the bedside.    [CC]   0312 EKG independently interpreted by me    Time 2:54 AM  Sinus 60  Normal P waves and WV intervals  QRS-normal axis, normal QRS  ST, T waves-nonspecific changes  No prior to compare [DB]   0324 EKG independently interpreted by me    Time 2:54 AM  Sinus 60  Normal P waves and WV intervals  QRS-normal axis, normal QRS  ST, T waves-nonspecific changes [DB]   0327 XR Chest 1 View  My independent interpretation of the chest x-ray is no acute infiltrate [CC]   0337 CT Angiogram Chest  My independent interpretation of the CTA is no obvious aortic dissection [CC]   0337 CT Angiogram Abdomen Pelvis  My depend interpretation of the CTA of the abdomen pelvis is large gallstone in the gallbladder without gallbladder wall thickening [CC]   0340 ALT (SGPT)(!): 89 [CC]   0341 AST (SGOT)(!): 168 [CC]   0342 HS Troponin T: 9 [CC]   0343 Patient reports he is pain-free.  He did not take any of the pain medication as his pain had resolved by the time nursing staff went to give him the pain  medication.  CT is pending. [CC]   0354 Lipase(!): 63 [CC]   0431 Rechecked on patient: He has remained pain-free while here in the ED.  Discussed with him that we will obtain gallbladder ultrasound to rule out gallbladder pathology but I suspect at the least symptomatic cholelithiasis.  I have a low suspicion for cardiac etiology given the nature of the patient's pain and sudden resolution of pain without intervention. [CC]   0530  Liver  Patient is having no pain at this time.  I do not suspect acute cholecystitis. [CC]   0540 HS Troponin T: 11 [CC]      ED Course User Index  [CC] Kassidy Christianson PA-C  [DB] Chito Bermeo MD           AS OF 05:55 EDT VITALS:    BP - 111/67  HR - 63  TEMP - 97.6 °F (36.4 °C) (Tympanic)  O2 SATS - 99%    Clinical Scores:   HEART Score: 3      MDM:  Patient is a 48-year-old male presents emergency department today with epigastric abdominal pain/chest pain that began suddenly tonight.  On arrival here in the emergency department vitals are reassuring, he is afebrile but the patient appears to be in acute distress.  He appears extremely uncomfortable and pale.  He did have equal pulses in all 4 extremities no pulsatile mass but given his clinical presentation I had concern for aortic dissection.  Prior to going to CT without any intervention here in the ED the patient's pain completely resolved without any intervention.  He did not have any change with the nitro given with EMS and I do not believe this contributed to his pain resolving.  Given the complete and sudden resolution of the patient's pain I suspect either esophageal spasm versus symptomatic cholelithiasis given that he was found to have cholelithiasis on CT.  Questionable gallbladder wall thickening and ultrasound was obtained which cannot rule out cholecystitis but on reevaluation patient is still completely pain-free and has no pain in the right upper quadrant.  He has negative Araya sign.  LFTs are minimally  elevated but his white blood cell count is normal and symptoms are not consistent with acute cholecystitis.  Patient had EKG and troponin x 2 which were negative.  Given clinical presentation of low suspicion for cardiac etiology.  He has a low risk heart score.  Will refer to general surgery for further evaluation of cholelithiasis.  He is already on omeprazole for GERD.  Patient will be discharged with outpatient follow-up.      COMPLEXITY OF CARE  Admission was considered but after careful review of the patient's presentation, physical examination, diagnostic results, and response to treatment the patient may be safely discharged with outpatient follow-up.        DIAGNOSIS  Final diagnoses:   Calculus of gallbladder with biliary obstruction but without cholecystitis   Esophageal spasm   Atypical chest pain         DISPOSITION  ED Disposition       ED Disposition   Discharge    Condition   Stable    Comment   --                      Please note that portions of this document were completed with a voice recognition program.    Note Disclaimer: At [facility], we believe that sharing information builds trust and better relationships. You are receiving this note because you recently visited [facility]. It is possible you will see health information before a provider has talked with you about it. This kind of information can be easy to misunderstand. To help you fully understand what it means for your health, we urge you to discuss this note with your provider.     Kassidy Christianson PA-C  06/12/24 0555